# Patient Record
Sex: MALE | Race: WHITE | URBAN - METROPOLITAN AREA
[De-identification: names, ages, dates, MRNs, and addresses within clinical notes are randomized per-mention and may not be internally consistent; named-entity substitution may affect disease eponyms.]

---

## 2024-06-16 ENCOUNTER — OFFICE VISIT (OUTPATIENT)
Dept: URGENT CARE | Facility: URGENT CARE | Age: 24
End: 2024-06-16
Payer: COMMERCIAL

## 2024-06-16 VITALS
SYSTOLIC BLOOD PRESSURE: 131 MMHG | TEMPERATURE: 98.9 F | OXYGEN SATURATION: 97 % | DIASTOLIC BLOOD PRESSURE: 74 MMHG | RESPIRATION RATE: 20 BRPM | WEIGHT: 161 LBS | HEART RATE: 68 BPM

## 2024-06-16 DIAGNOSIS — R11.2 NAUSEA AND VOMITING, UNSPECIFIED VOMITING TYPE: ICD-10-CM

## 2024-06-16 DIAGNOSIS — J36 PERITONSILLAR CELLULITIS: Primary | ICD-10-CM

## 2024-06-16 DIAGNOSIS — R51.9 ACUTE NONINTRACTABLE HEADACHE, UNSPECIFIED HEADACHE TYPE: ICD-10-CM

## 2024-06-16 LAB
DEPRECATED S PYO AG THROAT QL EIA: NEGATIVE
FLUAV AG SPEC QL IA: NEGATIVE
FLUBV AG SPEC QL IA: NEGATIVE

## 2024-06-16 PROCEDURE — 99203 OFFICE O/P NEW LOW 30 MIN: CPT | Performed by: FAMILY MEDICINE

## 2024-06-16 PROCEDURE — 87651 STREP A DNA AMP PROBE: CPT | Performed by: FAMILY MEDICINE

## 2024-06-16 PROCEDURE — 87635 SARS-COV-2 COVID-19 AMP PRB: CPT | Performed by: FAMILY MEDICINE

## 2024-06-16 PROCEDURE — 87804 INFLUENZA ASSAY W/OPTIC: CPT | Performed by: FAMILY MEDICINE

## 2024-06-16 RX ORDER — HYDROXYZINE HYDROCHLORIDE 50 MG/1
50 TABLET, FILM COATED ORAL 3 TIMES DAILY PRN
COMMUNITY
Start: 2024-05-21

## 2024-06-16 RX ORDER — QUETIAPINE FUMARATE 50 MG/1
50 TABLET, FILM COATED ORAL AT BEDTIME
COMMUNITY
Start: 2024-05-21

## 2024-06-16 RX ORDER — ONDANSETRON 4 MG/1
4 TABLET, ORALLY DISINTEGRATING ORAL EVERY 8 HOURS PRN
Qty: 10 TABLET | Refills: 0 | Status: SHIPPED | OUTPATIENT
Start: 2024-06-16

## 2024-06-16 RX ORDER — QUETIAPINE FUMARATE 25 MG/1
25 TABLET, FILM COATED ORAL EVERY MORNING
COMMUNITY
Start: 2024-04-02

## 2024-06-17 LAB
GROUP A STREP BY PCR: NOT DETECTED
SARS-COV-2 RNA RESP QL NAA+PROBE: NEGATIVE

## 2024-06-17 NOTE — PROGRESS NOTES
ASSESSMENT/PLAN:      ICD-10-CM    1. Peritonsillar cellulitis  J36 amoxicillin-clavulanate (AUGMENTIN) 875-125 MG tablet     DISCONTINUED: amoxicillin-clavulanate (AUGMENTIN) 875-125 MG tablet      2. Nausea and vomiting, unspecified vomiting type  R11.2 ondansetron (ZOFRAN ODT) 4 MG ODT tab      3. Acute nonintractable headache, unspecified headache type  R51.9           Patient Instructions     Start Augmentin 2 times a day for 10 days always take with food to prevent nausea vomiting for your tonsillitis     Start Zofran  for nausea -place dissolve tablet under your tongue and allow to dissolve     For headache    Start  Ibuprofen (motrin/advil)  600 mg 3 times a day always take with food for the next 2-3 days until pain resolves-maximum dose of ibuprofen is 2400 mg in 24 hours     If the ibuprofen is wearing off before you are due for your next dose, take tylenol     Acetaminophen (Tylenol ) 1000 mg 3 times a day for the next 5 to 7 days until pain resolves-maximum dose of acetaminophen (tylenol)  is 3000 mg in 24-hours           Return to clinic or to ER if symptoms worsen     Follow up with your primary care provider or clinic in about 2-3 days  if your symptoms do not improve          Reviewed medication instructions and side effects. Follow up if experiences side effects.     I reviewed supportive care, otc meds to use if needed, expected course, and signs of concern.  Follow up as needed or if he does not improve within  1-2 days or if worsens in any way.  Reviewed red flag symptoms and is to go to the ER if experiences any of these.     The use of Dragon/[employer] dictation services may have been used to construct the content in this note; any grammatical or spelling errors are non-intentional. Please contact the author of this note directly if you are in need of any clarification.      On the day of the encounter, time spend on chart review, patient visit, review of testing, documentation was 30  minutes               Patient presents with:  Urgent Care: c/o fever, migraine, vomiting, cough and runny stuffy nose for 1-3 days.        Subjective     Yousif Keane is a 23 year old male who presents to clinic today for the following health issues:    HPI     Acute Illness  Acute illness concerns: onset  3 day ago of fever, cough, congestion and last pm headache causing nausea, episode of emesis, continued headache today, cough, sore throat     Symptoms:  Fever: No  Chills/Sweats: YES- chills  Headache (location?): YES- frontal,no hx of migraines  Sinus Pressure: No  Conjunctivitis:  No  Ear Pain: no  Rhinorrhea: YES  Congestion: YES  Sore Throat: YES  Cough: yes  Wheeze: No  Decreased Appetite: YES  Nausea: YES-due to headache  Vomiting: YES-few episodes   Diarrhea: No  Fatigue/Achiness: YES-both faitigue and myalgias   Sick/Strep Exposure: YES-  Therapies tried and outcome:   Tylenol/ibuprofen, mild relief of headache        Past Medical History:   Diagnosis Date    NO ACTIVE PROBLEMS      Social History     Tobacco Use    Smoking status: Passive Smoke Exposure - Never Smoker    Smokeless tobacco: Never   Substance Use Topics    Alcohol use: No       Current Outpatient Medications   Medication Sig Dispense Refill    amoxicillin-clavulanate (AUGMENTIN) 875-125 MG tablet Take 1 tablet by mouth 2 times daily 40 tablet 0    FLUoxetine (PROZAC) 20 MG capsule Take 20 mg by mouth daily      hydrOXYzine HCl (ATARAX) 50 MG tablet Take 50 mg by mouth 3 times daily as needed      ondansetron (ZOFRAN ODT) 4 MG ODT tab Take 1 tablet (4 mg) by mouth every 8 hours as needed for nausea 10 tablet 0    QUEtiapine (SEROQUEL) 25 MG tablet Take 25 mg by mouth every morning      QUEtiapine (SEROQUEL) 50 MG tablet Take 50 mg by mouth at bedtime      albuterol (PROAIR HFA, PROVENTIL HFA, VENTOLIN HFA) 108 (90 BASE) MCG/ACT inhaler Inhale 2 puffs into the lungs every 4 hours as needed for shortness of breath / dyspnea or wheezing  (Patient not taking: Reported on 6/16/2024) 1 Inhaler 0     Allergies   Allergen Reactions    Keflex [Cephalexin] Rash             ROS are negative, except as otherwise noted HPI      Objective    /74   Pulse 68   Temp 98.9  F (37.2  C) (Tympanic)   Resp 20   Wt 73 kg (161 lb)   SpO2 97%   There is no height or weight on file to calculate BMI.  Physical Exam     /74   Pulse 68   Temp 98.9  F (37.2  C) (Tympanic)   Resp 20   Wt 73 kg (161 lb)   SpO2 97%   GENERAL: Alert and  mild  distress.   HEENT: Diffuse pharyngeal erythema.exudate, tonsils swollen, symmetric, uvula midline.  Sclera, lids and conjunctiva are normal. PERRL, EOMI,  Nose congestion and ears clear.  NECK: mild adenopathy-anterior chain bilateral  CHEST:  clear, no wheezing or rales. Normal symmetric air entry throughout both lung fields. No chest wall deformities or tenderness.  HEART:  S1 and S2 normal, no murmurs, clicks, gallops or rubs. Regular rate and rhythm.  SKIN:  Only benign skin findings.   NEURO:Alert and oriented x3, normal strength and tone, normal gait, neg Rhomberg negative        Diagnostic Test Results:  Labs reviewed in Epic  Results for orders placed or performed in visit on 06/16/24   Symptomatic COVID-19 Virus (Coronavirus) by PCR Nose     Status: Normal    Specimen: Nose; Swab   Result Value Ref Range    SARS CoV2 PCR Negative Negative    Narrative    Testing was performed using the Aptima SARS-CoV-2 Assay on the  [employer] System. Additional information about this  Emergency Use Authorization (EUA) assay can be found via the Lab  Guide. This test should be ordered for the detection of SARS-CoV-2 in  individuals who meet SARS-CoV-2 clinical and/or epidemiological  criteria. Test performance is unknown in asymptomatic patients. This  test is for in vitro diagnostic use under the FDA EUA for  laboratories certified under CLIA to perform high complexity testing.  This test has not been FDA cleared or  approved. A negative result  does not rule out the presence of PCR inhibitors in the specimen or  target RNA in concentration below the limit of detection for the  assay. The possibility of a false negative should be considered if  the patient's recent exposure or clinical presentation suggests  COVID-19. This test was validated by the [facility]. This laboratory is certified under  the Clinical Laboratory Improvement Amendments of 1988 (CLIA-88) as  qualified to perform high complexity laboratory testing.   Influenza A & B Antigen     Status: Normal    Specimen: Nose; Swab   Result Value Ref Range    Influenza A antigen Negative Negative    Influenza B antigen Negative Negative    Narrative    Test results must be correlated with clinical data. If necessary, results should be confirmed by a molecular assay or viral culture.   Streptococcus A Rapid Screen w/Reflex to PCR     Status: Normal    Specimen: Throat; Swab   Result Value Ref Range    Group A Strep antigen Negative Negative   Group A Streptococcus PCR Throat Swab     Status: Normal    Specimen: Throat; Swab   Result Value Ref Range    Group A strep by PCR Not Detected Not Detected    Narrative    The Xpert Xpress Strep A test, performed on the [employer], is a rapid, qualitative in vitro diagnostic test for the detection of Streptococcus pyogenes (Group A ß-hemolytic Streptococcus, Strep A) in throat swab specimens from patients with signs and symptoms of pharyngitis. The Xpert Xpress Strep A test can be used as an aid in the diagnosis of Group A Streptococcal pharyngitis. The assay is not intended to monitor treatment for Group A Streptococcus infections. The Xpert Xpress Strep A test utilizes an automated real-time polymerase chain reaction (PCR) to detect Streptococcus pyogenes DNA.

## 2024-06-17 NOTE — PATIENT INSTRUCTIONS
Start Augmentin 2 times a day for 10 days always take with food to prevent nausea vomiting for your tonsillitis     Start Zofran  for nausea -place dissolve tablet under your tongue and allow to dissolve     For headache    Start  Ibuprofen (motrin/advil)  600 mg 3 times a day always take with food for the next 2-3 days until pain resolves-maximum dose of ibuprofen is 2400 mg in 24 hours     If the ibuprofen is wearing off before you are due for your next dose, take tylenol     Acetaminophen (Tylenol ) 1000 mg 3 times a day for the next 5 to 7 days until pain resolves-maximum dose of acetaminophen (tylenol)  is 3000 mg in 24-hours           Return to clinic or to ER if symptoms worsen     Follow up with your primary care provider or clinic in about 2-3 days  if your symptoms do not improve

## 2024-06-29 ENCOUNTER — HEALTH MAINTENANCE LETTER (OUTPATIENT)
Age: 24
End: 2024-06-29

## 2025-07-13 ENCOUNTER — HEALTH MAINTENANCE LETTER (OUTPATIENT)
Age: 25
End: 2025-07-13